# Patient Record
Sex: FEMALE | Race: OTHER | HISPANIC OR LATINO | URBAN - METROPOLITAN AREA
[De-identification: names, ages, dates, MRNs, and addresses within clinical notes are randomized per-mention and may not be internally consistent; named-entity substitution may affect disease eponyms.]

---

## 2022-08-19 ENCOUNTER — EMERGENCY (EMERGENCY)
Facility: HOSPITAL | Age: 27
LOS: 1 days | Discharge: HOME | End: 2022-08-19
Attending: STUDENT IN AN ORGANIZED HEALTH CARE EDUCATION/TRAINING PROGRAM
Payer: COMMERCIAL

## 2022-08-19 VITALS
DIASTOLIC BLOOD PRESSURE: 73 MMHG | RESPIRATION RATE: 18 BRPM | TEMPERATURE: 99 F | SYSTOLIC BLOOD PRESSURE: 109 MMHG | HEART RATE: 60 BPM | OXYGEN SATURATION: 99 %

## 2022-08-19 VITALS
SYSTOLIC BLOOD PRESSURE: 121 MMHG | TEMPERATURE: 98 F | WEIGHT: 147.93 LBS | RESPIRATION RATE: 16 BRPM | HEIGHT: 72 IN | DIASTOLIC BLOOD PRESSURE: 78 MMHG | HEART RATE: 54 BPM | OXYGEN SATURATION: 99 %

## 2022-08-19 DIAGNOSIS — F34.1 DYSTHYMIC DISORDER: ICD-10-CM

## 2022-08-19 LAB
ALBUMIN SERPL ELPH-MCNC: 4.8 G/DL — SIGNIFICANT CHANGE UP (ref 3.3–5.2)
ALP SERPL-CCNC: 80 U/L — SIGNIFICANT CHANGE UP (ref 40–120)
ALT FLD-CCNC: 15 U/L — SIGNIFICANT CHANGE UP
AMPHET UR-MCNC: NEGATIVE — SIGNIFICANT CHANGE UP
ANION GAP SERPL CALC-SCNC: 12 MMOL/L — SIGNIFICANT CHANGE UP (ref 5–17)
APAP SERPL-MCNC: <3 UG/ML — LOW (ref 10–26)
APPEARANCE UR: ABNORMAL
AST SERPL-CCNC: 21 U/L — SIGNIFICANT CHANGE UP
BACTERIA # UR AUTO: ABNORMAL
BARBITURATES UR SCN-MCNC: NEGATIVE — SIGNIFICANT CHANGE UP
BASOPHILS # BLD AUTO: 0.02 K/UL — SIGNIFICANT CHANGE UP (ref 0–0.2)
BASOPHILS NFR BLD AUTO: 0.3 % — SIGNIFICANT CHANGE UP (ref 0–2)
BENZODIAZ UR-MCNC: NEGATIVE — SIGNIFICANT CHANGE UP
BILIRUB SERPL-MCNC: 0.7 MG/DL — SIGNIFICANT CHANGE UP (ref 0.4–2)
BILIRUB UR-MCNC: NEGATIVE — SIGNIFICANT CHANGE UP
BUN SERPL-MCNC: 8.1 MG/DL — SIGNIFICANT CHANGE UP (ref 8–20)
CALCIUM SERPL-MCNC: 9.6 MG/DL — SIGNIFICANT CHANGE UP (ref 8.4–10.5)
CHLORIDE SERPL-SCNC: 105 MMOL/L — SIGNIFICANT CHANGE UP (ref 98–107)
CO2 SERPL-SCNC: 23 MMOL/L — SIGNIFICANT CHANGE UP (ref 22–29)
COCAINE METAB.OTHER UR-MCNC: NEGATIVE — SIGNIFICANT CHANGE UP
COLOR SPEC: YELLOW — SIGNIFICANT CHANGE UP
CREAT SERPL-MCNC: 0.51 MG/DL — SIGNIFICANT CHANGE UP (ref 0.5–1.3)
DIFF PNL FLD: ABNORMAL
EGFR: 132 ML/MIN/1.73M2 — SIGNIFICANT CHANGE UP
EOSINOPHIL # BLD AUTO: 0.05 K/UL — SIGNIFICANT CHANGE UP (ref 0–0.5)
EOSINOPHIL NFR BLD AUTO: 0.7 % — SIGNIFICANT CHANGE UP (ref 0–6)
EPI CELLS # UR: ABNORMAL
ETHANOL SERPL-MCNC: <10 MG/DL — SIGNIFICANT CHANGE UP (ref 0–9)
GLUCOSE SERPL-MCNC: 92 MG/DL — SIGNIFICANT CHANGE UP (ref 70–99)
GLUCOSE UR QL: NEGATIVE MG/DL — SIGNIFICANT CHANGE UP
HCG SERPL-ACNC: <4 MIU/ML — SIGNIFICANT CHANGE UP
HCT VFR BLD CALC: 42.9 % — SIGNIFICANT CHANGE UP (ref 34.5–45)
HGB BLD-MCNC: 14.4 G/DL — SIGNIFICANT CHANGE UP (ref 11.5–15.5)
IMM GRANULOCYTES NFR BLD AUTO: 0.1 % — SIGNIFICANT CHANGE UP (ref 0–1.5)
KETONES UR-MCNC: NEGATIVE — SIGNIFICANT CHANGE UP
LEUKOCYTE ESTERASE UR-ACNC: ABNORMAL
LYMPHOCYTES # BLD AUTO: 1.47 K/UL — SIGNIFICANT CHANGE UP (ref 1–3.3)
LYMPHOCYTES # BLD AUTO: 21.4 % — SIGNIFICANT CHANGE UP (ref 13–44)
MCHC RBC-ENTMCNC: 30.7 PG — SIGNIFICANT CHANGE UP (ref 27–34)
MCHC RBC-ENTMCNC: 33.6 GM/DL — SIGNIFICANT CHANGE UP (ref 32–36)
MCV RBC AUTO: 91.5 FL — SIGNIFICANT CHANGE UP (ref 80–100)
METHADONE UR-MCNC: NEGATIVE — SIGNIFICANT CHANGE UP
MONOCYTES # BLD AUTO: 0.4 K/UL — SIGNIFICANT CHANGE UP (ref 0–0.9)
MONOCYTES NFR BLD AUTO: 5.8 % — SIGNIFICANT CHANGE UP (ref 2–14)
NEUTROPHILS # BLD AUTO: 4.92 K/UL — SIGNIFICANT CHANGE UP (ref 1.8–7.4)
NEUTROPHILS NFR BLD AUTO: 71.7 % — SIGNIFICANT CHANGE UP (ref 43–77)
NITRITE UR-MCNC: NEGATIVE — SIGNIFICANT CHANGE UP
OPIATES UR-MCNC: NEGATIVE — SIGNIFICANT CHANGE UP
PCP SPEC-MCNC: SIGNIFICANT CHANGE UP
PCP UR-MCNC: NEGATIVE — SIGNIFICANT CHANGE UP
PH UR: 6.5 — SIGNIFICANT CHANGE UP (ref 5–8)
PLATELET # BLD AUTO: 185 K/UL — SIGNIFICANT CHANGE UP (ref 150–400)
POTASSIUM SERPL-MCNC: 4.1 MMOL/L — SIGNIFICANT CHANGE UP (ref 3.5–5.3)
POTASSIUM SERPL-SCNC: 4.1 MMOL/L — SIGNIFICANT CHANGE UP (ref 3.5–5.3)
PROT SERPL-MCNC: 8.3 G/DL — SIGNIFICANT CHANGE UP (ref 6.6–8.7)
PROT UR-MCNC: NEGATIVE — SIGNIFICANT CHANGE UP
RBC # BLD: 4.69 M/UL — SIGNIFICANT CHANGE UP (ref 3.8–5.2)
RBC # FLD: 11.9 % — SIGNIFICANT CHANGE UP (ref 10.3–14.5)
RBC CASTS # UR COMP ASSIST: ABNORMAL /HPF (ref 0–4)
SALICYLATES SERPL-MCNC: <0.6 MG/DL — LOW (ref 10–20)
SARS-COV-2 RNA SPEC QL NAA+PROBE: SIGNIFICANT CHANGE UP
SODIUM SERPL-SCNC: 140 MMOL/L — SIGNIFICANT CHANGE UP (ref 135–145)
SP GR SPEC: 1.01 — SIGNIFICANT CHANGE UP (ref 1.01–1.02)
THC UR QL: NEGATIVE — SIGNIFICANT CHANGE UP
TSH SERPL-MCNC: 1.08 UIU/ML — SIGNIFICANT CHANGE UP (ref 0.27–4.2)
UROBILINOGEN FLD QL: NEGATIVE MG/DL — SIGNIFICANT CHANGE UP
WBC # BLD: 6.87 K/UL — SIGNIFICANT CHANGE UP (ref 3.8–10.5)
WBC # FLD AUTO: 6.87 K/UL — SIGNIFICANT CHANGE UP (ref 3.8–10.5)
WBC UR QL: ABNORMAL /HPF (ref 0–5)

## 2022-08-19 PROCEDURE — 84443 ASSAY THYROID STIM HORMONE: CPT

## 2022-08-19 PROCEDURE — 99285 EMERGENCY DEPT VISIT HI MDM: CPT

## 2022-08-19 PROCEDURE — 90792 PSYCH DIAG EVAL W/MED SRVCS: CPT

## 2022-08-19 PROCEDURE — U0003: CPT

## 2022-08-19 PROCEDURE — U0005: CPT

## 2022-08-19 PROCEDURE — 85025 COMPLETE CBC W/AUTO DIFF WBC: CPT

## 2022-08-19 PROCEDURE — 80307 DRUG TEST PRSMV CHEM ANLYZR: CPT

## 2022-08-19 PROCEDURE — 93005 ELECTROCARDIOGRAM TRACING: CPT

## 2022-08-19 PROCEDURE — 80053 COMPREHEN METABOLIC PANEL: CPT

## 2022-08-19 PROCEDURE — 99283 EMERGENCY DEPT VISIT LOW MDM: CPT

## 2022-08-19 PROCEDURE — 93010 ELECTROCARDIOGRAM REPORT: CPT

## 2022-08-19 PROCEDURE — 36415 COLL VENOUS BLD VENIPUNCTURE: CPT

## 2022-08-19 PROCEDURE — 84702 CHORIONIC GONADOTROPIN TEST: CPT

## 2022-08-19 PROCEDURE — 81001 URINALYSIS AUTO W/SCOPE: CPT

## 2022-08-19 NOTE — ED BEHAVIORAL HEALTH ASSESSMENT NOTE - DETAILS
na with grandmother  when Pt works h/o OD 8 pills age 16, unreported Pt will return to ED if SI or if feels unsafe

## 2022-08-19 NOTE — CHART NOTE - NSCHARTNOTEFT_GEN_A_CORE
Fidencio: pt seen by behavioral NP(Harrison) pt to benefit from therapy. [facility] services explained ,in agreement to appointment . Schedule reviewed , appt given for Friday August 26 at 14:30. Release of info signed per protocol, brochure given . Best number to call pt . Aware to call 911 or to go to nearest hospital if symptoms worsen.  No other concerns reported at this moment. Email to be sent asap.

## 2022-08-19 NOTE — ED PROVIDER NOTE - NSFOLLOWUPINSTRUCTIONS_ED_ALL_ED_FT
Depression    Depression is a mental illness that usually causes feelings of sadness, hopelessness, or helplessness. Some people with this disorder do not feel particularly sad but lose interest in doing things they used to enjoy. Major depressive disorder also can cause physical symptoms. It can interfere with work, school, relationships, and other normal everyday activities. If you were started on a medication, make sure to take exactly as prescribed and follow up with a psychiatrist.    SEEK IMMEDIATE MEDICAL CARE IF YOU HAVE ANY OF THE FOLLOWING SYMPTOMS: thoughts about hurting or killing yourself, thoughts about hurting or killing somebody else, hallucinations, or worsening depression.     Depresión    La depresión es ashlee enfermedad mental que generalmente provoca sentimientos de tristeza, desesperanza o impotencia. Algunas personas con george trastorno no se sienten particularmente tristes, beata pierden interés en hacer las cosas que solían disfrutar. El trastorno depresivo mayor también puede causar síntomas físicos. Puede interferir con el trabajo, la escuela, las relaciones y otras actividades cotidianas normales. Si comenzó a prasanth un medicamento, asegúrese de tomarlo exactamente según lo recetado y kelsy un seguimiento con un psiquiatra.    BUSQUE ATENCIÓN MÉDICA DE INMEDIATO SI TIENE ALGUNO DE LOS SIGUIENTES SÍNTOMAS: pensamientos sobre lastimarse o suicidarse, pensamientos sobre lastimar o matar a otra persona, alucinaciones o empeoramiento de la depresión.

## 2022-08-19 NOTE — ED PROVIDER NOTE - NS ED ROS FT
Constitutional: No fever.  HEENT: Visual disturbances during HA per HPI. No neck pain.   Pulm: No shortness of breath.  Cardio: No chest pain.  GI:  No abdominal pain, No nausea, No vomiting, No diarrhea.  : No dysuria.  Neuro: Headaches per HPI.   MSK: No joint pain.

## 2022-08-19 NOTE — ED ADULT NURSE NOTE - OBJECTIVE STATEMENT
Pt sent from clinic for depression and psychiatric evaluation. Pt admits to SI but says she has no active plan and denies HI, AH, and VH. Pt says her depression has been elevated this year due to family issues and other stressors in life.

## 2022-08-19 NOTE — ED PROVIDER NOTE - ATTENDING CONTRIBUTION TO CARE
ALF Ac: 27F 1 mo suicidality, here for same, will check labs,  consult, follow up studies, reassess, dispo.     I have personally performed a face to face diagnostic evaluation on this patient.  I have reviewed the resident's note and agree with the history, exam, and plan of care, except as noted.   My medical decision making and observations are found above.

## 2022-08-19 NOTE — ED BEHAVIORAL HEALTH ASSESSMENT NOTE - SUMMARY
26 yo  female, from [facility] and moved to this country age 16 to live with mother, now lives alone with 7 yo daughter who is under care of her paternal grandmother, no formal PPH, tx, psych admissions, report one suicide attempt by OD pills age 17, unreported because she missed her family in [facility] and did not want to live with her mother at the time, no PMH reports was at GYN and told her of intermittent passive SI sometimes and financial stressors  and was sent to ED for eval.  Pt speaks english reports worry about her finances and living where she is because is dirty.  Pt recently found a [employer] apt and is happy to be moving.  Pt reports she sometimes worries about her finances and has to work all the time to support she and daughter and feels she does not see her daughter as much as she wants.  Pt reports she is happy with her daughters grandmother as her caregiver and daughter likes her as well and child has been spending more time with her father when Pt works.  Pt denies active SIIP and states she would never hurt herself due to responsibility to daughter.  She has medical insurance and is interested in tx for depressed  mood which is intermittent.  Other c/o is too much/little sleep, and decreased hunger.  Denies AH/VH psychosis deric or acute conditions which requires psych admission.  Pt agreeable to referral to [facility].  Pt denies anyone available for collaterals.

## 2022-08-19 NOTE — ED BEHAVIORAL HEALTH ASSESSMENT NOTE - HPI (INCLUDE ILLNESS QUALITY, SEVERITY, DURATION, TIMING, CONTEXT, MODIFYING FACTORS, ASSOCIATED SIGNS AND SYMPTOMS)
28 yo  female, from [facility] and moved to this country age 16 to live with mother, now lives alone with 5 yo daughter who is under care of her paternal grandmother, no formal PPH, tx, psych admissions, report one suicide attempt by OD pills age 17, unreported because she missed her family in [facility] and did not want to live with her mother at the time, no PMH reports was at GYN and told her of intermittent passive SI sometimes and financial stressors  and was sent to ED for eval.  Pt speaks english reports worry about her finances and living where she is because is dirty.  Pt recently found a [employer] apt and is happy to be moving.  Pt reports she sometimes worries about her finances and has to work all the time to support she and daughter and feels she does not see her daughter as much as she wants.  Pt reports she is happy with her daughters grandmother as her caregiver and daughter likes her as well and child has been spending more time with her father when Pt works.  Pt denies active SIIP and states she would never hurt herself due to responsibility to daughter.  She has medical insurance and is interested in tx for depressed  mood which is intermittent.  Other c/o is too much/little sleep, and decreased hunger.  Denies AH/VH psychosis deric or acute conditions which requires psych admission.  Pt agreeable to referral to [facility].  Pt denies anyone available for collaterals.

## 2022-08-19 NOTE — ED ADULT TRIAGE NOTE - SPO2 (%)
SUBJECTIVE:     Nhi Mims is a 10 month old female, here for a routine health maintenance visit.    Patient was roomed by: DEANDRE HINES MA    Well Child     Social History  Forms to complete? No  Child lives with::  Mother, father and brother  Who takes care of your child?:  , father and mother  Languages spoken in the home:  English and Chinese  Recent family changes/ special stressors?:  None noted    Safety / Health Risk  Is your child around anyone who smokes?  No    TB Exposure:     No TB exposure    Car seat < 6 years old, in  back seat, rear-facing, 5-point restraint? Yes    Home Safety Survey:      Stairs Gated?:  Yes     Wood stove / Fireplace screened?  Not applicable     Poisons / cleaning supplies out of reach?:  Yes     Swimming pool?:  No     Firearms in the home?: No      Hearing / Vision  Hearing or vision concerns?  No concerns, hearing and vision subjectively normal    Daily Activities    Water source:  City water and bottled water  Nutrition:  Formula, pureed foods and finger feeding  Formula:  Simiilac  Vitamins & Supplements:  No    Elimination       Urinary frequency:more than 6 times per 24 hours     Stool frequency: 1-3 times per 24 hours     Stool consistency: soft     Elimination problems:  None    Sleep      Sleep arrangement:crib    Sleep position:  On back and on stomach    Sleep pattern: wakes at night for feedings, sleeps through the night, regular bedtime routine, waking at night and naps (add details)      Dental visit recommended: No  Dental Varnish Application    Contraindications: None    Dental Fluoride applied to teeth by: MA/LPN/RN    Next treatment due in:  Next preventive care visit    DEVELOPMENT  Screening tool used, reviewed with parent/guardian: Electronic M-CHAT-R No flowsheet data found. Follow-up:    ASQ 9 M Communication Gross Motor Fine Motor Problem Solving Personal-social   Score 45 45 50 45 35   Cutoff 13.97 17.82 31.32 28.72 18.91   Result  "Passed Passed Passed Passed Passed         PROBLEM LIST  Patient Active Problem List   Diagnosis     Meconium aspiration syndrome     MEDICATIONS  No current outpatient medications on file.      ALLERGY  No Known Allergies    IMMUNIZATIONS  Immunization History   Administered Date(s) Administered     DTAP-IPV/HIB (PENTACEL) 2019     DTaP / Hep B / IPV 2019, 2019     Hep B, Peds or Adolescent 2019, 2019     Pedvax-hib 2019, 2019     Pneumo Conj 13-V (2010&after) 2019, 2019, 2019     Rotavirus, monovalent, 2-dose 2019, 2019       HEALTH HISTORY SINCE LAST VISIT  No surgery, major illness or injury since last physical exam    ROS  All other systems on a 10-point review are negative, unless otherwise noted in HPI      OBJECTIVE:   EXAM  Pulse 112   Temp 98  F (36.7  C) (Axillary)   Resp 26   Ht 2' 3.17\" (0.69 m)   Wt 17 lb 7.5 oz (7.924 kg)   HC 17.72\" (45 cm)   SpO2 99%   BMI 16.64 kg/m    71 %ile based on WHO (Girls, 0-2 years) head circumference-for-age based on Head Circumference recorded on 2019.  29 %ile based on WHO (Girls, 0-2 years) weight-for-age data based on Weight recorded on 2019.  15 %ile based on WHO (Girls, 0-2 years) Length-for-age data based on Length recorded on 2019.  48 %ile based on WHO (Girls, 0-2 years) weight-for-recumbent length based on body measurements available as of 2019.  GENERAL: Active, alert,  no  distress.  SKIN: Clear. No significant rash, abnormal pigmentation or lesions.  HEAD: Normocephalic. Normal fontanels and sutures.  EYES: Conjunctivae and cornea normal. Red reflexes present bilaterally. Symmetric light reflex and no eye movement on cover/uncover test  EARS: normal: no effusions, no erythema, normal landmarks  NOSE: Normal without discharge.  MOUTH/THROAT: Clear. No oral lesions.  NECK: Supple, no masses.  LYMPH NODES: No adenopathy  LUNGS: Clear. No rales, rhonchi, " wheezing or retractions  HEART: Regular rate and rhythm. Normal S1/S2. No murmurs. Normal femoral pulses.  ABDOMEN: Soft, non-tender, not distended, no masses or hepatosplenomegaly. Normal umbilicus and bowel sounds.   GENITALIA: Normal female external genitalia. Norbert stage I,  No inguinal herniae are present.  EXTREMITIES: Hips normal with symmetric creases and full range of motion. Symmetric extremities, no deformities  NEUROLOGIC: Normal tone throughout. Normal reflexes for age    ASSESSMENT/PLAN:       ICD-10-CM    1. Encounter for routine child health examination w/o abnormal findings Z00.129 DEVELOPMENTAL TEST, CERVANTES     APPLICATION TOPICAL FLUORIDE VARNISH (28056)     IMMUNIZATION ADMIN, FIRST       Anticipatory Guidance  Reviewed Anticipatory Guidance in patient instructions    Preventive Care Plan  Immunizations     See orders in [facility].  I reviewed the signs and symptoms of adverse effects and when to seek medical care if they should arise.  Referrals/Ongoing Specialty care: No   See other orders in [facility]    Resources:  [place] Child and Teen Checkups (C&TC) Schedule of Age-Related Screening Standards    FOLLOW-UP:    12 month Preventive Care visit    Yuri Ivan MD  [facility]   99

## 2022-08-19 NOTE — ED PROVIDER NOTE - PATIENT PORTAL LINK FT
You can access the FollowMyHealth Patient Portal offered by [facility] by registering at the following website: [email or web address]. By joining [employer]’s FollowMyHealth portal, you will also be able to view your health information using other applications (apps) compatible with our system.

## 2022-08-19 NOTE — ED PROVIDER NOTE - OBJECTIVE STATEMENT
The patient is a 27y Female w/ no PMH p/w 1 month of intermittent active suicidality and sent into ED for psych eval after appointment today. Pt states that she has had a depressed mood for the past month while endorsing thoughts of driving her car into an object that did not have people in, or around. Pt states that she is not currently expressing those thoughts or any other thoughts of suicide; they also deny HI. Pt states that she has had migraines, usually on the left side of head associated with vision changes; last episode was last week.

## 2022-08-19 NOTE — ED BEHAVIORAL HEALTH ASSESSMENT NOTE - DESCRIPTION
T(C): 37.1 (08-19-22 @ 15:36), Max: 37.1 (08-19-22 @ 15:36)  T(F): 98.7 (08-19-22 @ 15:36), Max: 98.7 (08-19-22 @ 15:36)  HR: 60 (08-19-22 @ 15:36) (54 - 60)  BP: 109/73 (08-19-22 @ 15:36) (109/73 - 121/78)  RR: 18 (08-19-22 @ 15:36) (16 - 18)  SpO2: 99% (08-19-22 @ 15:36) (99% - 99%) single mom, employed emigrated to [place] age 16 none

## 2022-08-19 NOTE — ED PROVIDER NOTE - PHYSICAL EXAMINATION
General: Patient is in no distress and laying comfortably on hospital bed.  HEENT: PERRLA, EOMI  Cardiac: Regular rate, with no murmurs or rubs appreciated.  Pulm: Clear to auscultation bilaterally, with no crackles, rhonchi, or wheezes appreciated.  Abd: Soft nontender, nondistended, abdomen. No guarding or rebound tenderness.  Skin: Skin is warm and dry.  Neuro: AAOX3- Person, place, time.

## 2022-08-19 NOTE — ED ADULT NURSE NOTE - NS ED NURSE LEVEL OF CONSCIOUSNESS ORIENTATION
ULTRASOUND OF THE PELVIS 



CLINICAL HISTORY: Pelvic pain.



COMPARISON STUDY: Pelvic CT dated 3/19/2013.



TECHNIQUE: Real-time, grayscale, and color flow sonography of the pelvis is

performed both transabdominally and endovaginally. Images are reviewed in the

transverse and longitudinal planes.



FINDINGS:



Uterus: The retroverted uterus is normal in size and echotexture, measuring 9.2

x 4.7 x 5.8 cm. Small nabothian cysts are incidentally noted in the cervix.



Endometrium: The endometrium is normal in appearance, and the endometrial stripe

is normal in thickness measuring up to 0.7 cm.



Ovaries: The ovaries are normal in size and morphology. The right ovary measures

3.1 x 1.7 x 2.2 cm and the left ovary measures 2.9 x 1.5 x 2.1 cm. Small

follicles are present bilaterally. Normal Doppler waveforms are shown within

both ovaries.



Pelvis: There is no free fluid in the cul-de-sac. No concerning adnexal lesion

is seen.





IMPRESSION: No acute sonographic abnormality is identified in the pelvis.







Electronically signed by:  Oziel Sharma M.D.

1/9/2017 4:46 PM



Dictated Date/Time:  1/9/2017 4:45 PM Oriented - self; Oriented - place; Oriented - time

## 2022-08-19 NOTE — ED BEHAVIORAL HEALTH ASSESSMENT NOTE - PATIENT'S CHIEF COMPLAINT
"I told my doctor I am sometimes depressed and do not want to be here but I would never hurt myself because of my daughter".

## 2022-08-19 NOTE — ED PROVIDER NOTE - PROGRESS NOTE DETAILS
ALF Ac: patient cleared by , reassessed, denies SI/HI,AVH, wants to go  her daughter from  after leaving, feels like she has good resources to follow up outpatient, understands she can reutrn for any worsening. will write for d/c as per plan in dispo section of note.

## 2022-08-19 NOTE — ED ADULT TRIAGE NOTE - CHIEF COMPLAINT QUOTE
Patient c/o depression x 2 years. Was at the doctor's office today and endorses depression. Denies SI/HI, states that "my daughter is the only thing keeping me going."

## 2022-08-19 NOTE — ED PROVIDER NOTE - CLINICAL SUMMARY MEDICAL DECISION MAKING FREE TEXT BOX
The patient is a 27y Female w/ no PMH p/w 1 month of intermittent active suicidality and sent into ED for psych eval after appointment today. Vitals not actionable; PE unremarkable. DDx includes suicidality due to psychiatric illness vs. medically induced mood disorder w/ suicidality. Labs ordered. Will consult psychiatry.     -Daniel Bear, MS4

## 2022-08-19 NOTE — ED PROVIDER NOTE - NSICDXPASTMEDICALHX_GEN_ALL_CORE_FT
PAST MEDICAL HISTORY:  MDD (major depressive disorder)     TB (tuberculosis) positive skin test , took meds

## 2022-08-19 NOTE — ED PROVIDER NOTE - NSFOLLOWUPCLINICS_GEN_ALL_ED_FT
[facility]  Psychiatry  [address] [place] [address]  Phone: [phone]  Fax:   Follow Up Time: 4-6 Days

## 2023-01-06 ENCOUNTER — EMERGENCY (EMERGENCY)
Facility: HOSPITAL | Age: 28
LOS: 1 days | Discharge: HOME | End: 2023-01-06
Attending: EMERGENCY MEDICINE
Payer: COMMERCIAL

## 2023-01-06 VITALS
SYSTOLIC BLOOD PRESSURE: 106 MMHG | DIASTOLIC BLOOD PRESSURE: 71 MMHG | TEMPERATURE: 98 F | RESPIRATION RATE: 18 BRPM | HEART RATE: 71 BPM | HEIGHT: 63 IN | OXYGEN SATURATION: 100 % | WEIGHT: 141.1 LBS

## 2023-01-06 PROBLEM — F32.9 MAJOR DEPRESSIVE DISORDER, SINGLE EPISODE, UNSPECIFIED: Chronic | Status: ACTIVE | Noted: 2022-08-19

## 2023-01-06 LAB
ALBUMIN SERPL ELPH-MCNC: 4.3 G/DL — SIGNIFICANT CHANGE UP (ref 3.3–5.2)
ALP SERPL-CCNC: 86 U/L — SIGNIFICANT CHANGE UP (ref 40–120)
ALT FLD-CCNC: 16 U/L — SIGNIFICANT CHANGE UP
ANION GAP SERPL CALC-SCNC: 9 MMOL/L — SIGNIFICANT CHANGE UP (ref 5–17)
AST SERPL-CCNC: 20 U/L — SIGNIFICANT CHANGE UP
BASOPHILS # BLD AUTO: 0.02 K/UL — SIGNIFICANT CHANGE UP (ref 0–0.2)
BASOPHILS NFR BLD AUTO: 0.3 % — SIGNIFICANT CHANGE UP (ref 0–2)
BILIRUB SERPL-MCNC: 0.3 MG/DL — LOW (ref 0.4–2)
BUN SERPL-MCNC: 4.3 MG/DL — LOW (ref 8–20)
CALCIUM SERPL-MCNC: 9.2 MG/DL — SIGNIFICANT CHANGE UP (ref 8.4–10.5)
CHLORIDE SERPL-SCNC: 105 MMOL/L — SIGNIFICANT CHANGE UP (ref 96–108)
CO2 SERPL-SCNC: 26 MMOL/L — SIGNIFICANT CHANGE UP (ref 22–29)
CREAT SERPL-MCNC: 0.47 MG/DL — LOW (ref 0.5–1.3)
D DIMER BLD IA.RAPID-MCNC: <150 NG/ML DDU — SIGNIFICANT CHANGE UP
EGFR: 135 ML/MIN/1.73M2 — SIGNIFICANT CHANGE UP
EOSINOPHIL # BLD AUTO: 0.07 K/UL — SIGNIFICANT CHANGE UP (ref 0–0.5)
EOSINOPHIL NFR BLD AUTO: 1 % — SIGNIFICANT CHANGE UP (ref 0–6)
GLUCOSE SERPL-MCNC: 82 MG/DL — SIGNIFICANT CHANGE UP (ref 70–99)
HCG SERPL-ACNC: <4 MIU/ML — SIGNIFICANT CHANGE UP
HCT VFR BLD CALC: 40.9 % — SIGNIFICANT CHANGE UP (ref 34.5–45)
HGB BLD-MCNC: 13.6 G/DL — SIGNIFICANT CHANGE UP (ref 11.5–15.5)
IMM GRANULOCYTES NFR BLD AUTO: 0.6 % — SIGNIFICANT CHANGE UP (ref 0–0.9)
LYMPHOCYTES # BLD AUTO: 2.06 K/UL — SIGNIFICANT CHANGE UP (ref 1–3.3)
LYMPHOCYTES # BLD AUTO: 28.5 % — SIGNIFICANT CHANGE UP (ref 13–44)
MCHC RBC-ENTMCNC: 30.2 PG — SIGNIFICANT CHANGE UP (ref 27–34)
MCHC RBC-ENTMCNC: 33.3 GM/DL — SIGNIFICANT CHANGE UP (ref 32–36)
MCV RBC AUTO: 90.7 FL — SIGNIFICANT CHANGE UP (ref 80–100)
MONOCYTES # BLD AUTO: 0.38 K/UL — SIGNIFICANT CHANGE UP (ref 0–0.9)
MONOCYTES NFR BLD AUTO: 5.2 % — SIGNIFICANT CHANGE UP (ref 2–14)
NEUTROPHILS # BLD AUTO: 4.67 K/UL — SIGNIFICANT CHANGE UP (ref 1.8–7.4)
NEUTROPHILS NFR BLD AUTO: 64.4 % — SIGNIFICANT CHANGE UP (ref 43–77)
PLATELET # BLD AUTO: 202 K/UL — SIGNIFICANT CHANGE UP (ref 150–400)
POTASSIUM SERPL-MCNC: 4.3 MMOL/L — SIGNIFICANT CHANGE UP (ref 3.5–5.3)
POTASSIUM SERPL-SCNC: 4.3 MMOL/L — SIGNIFICANT CHANGE UP (ref 3.5–5.3)
PROT SERPL-MCNC: 7.7 G/DL — SIGNIFICANT CHANGE UP (ref 6.6–8.7)
RBC # BLD: 4.51 M/UL — SIGNIFICANT CHANGE UP (ref 3.8–5.2)
RBC # FLD: 12 % — SIGNIFICANT CHANGE UP (ref 10.3–14.5)
SODIUM SERPL-SCNC: 140 MMOL/L — SIGNIFICANT CHANGE UP (ref 135–145)
TROPONIN T SERPL-MCNC: <0.01 NG/ML — SIGNIFICANT CHANGE UP (ref 0–0.06)
WBC # BLD: 7.24 K/UL — SIGNIFICANT CHANGE UP (ref 3.8–10.5)
WBC # FLD AUTO: 7.24 K/UL — SIGNIFICANT CHANGE UP (ref 3.8–10.5)

## 2023-01-06 PROCEDURE — 93010 ELECTROCARDIOGRAM REPORT: CPT

## 2023-01-06 PROCEDURE — 99285 EMERGENCY DEPT VISIT HI MDM: CPT

## 2023-01-06 PROCEDURE — 96374 THER/PROPH/DIAG INJ IV PUSH: CPT

## 2023-01-06 PROCEDURE — 93005 ELECTROCARDIOGRAM TRACING: CPT

## 2023-01-06 PROCEDURE — 71046 X-RAY EXAM CHEST 2 VIEWS: CPT | Mod: 26

## 2023-01-06 PROCEDURE — 80053 COMPREHEN METABOLIC PANEL: CPT

## 2023-01-06 PROCEDURE — 85025 COMPLETE CBC W/AUTO DIFF WBC: CPT

## 2023-01-06 PROCEDURE — 84484 ASSAY OF TROPONIN QUANT: CPT

## 2023-01-06 PROCEDURE — 85379 FIBRIN DEGRADATION QUANT: CPT

## 2023-01-06 PROCEDURE — 84702 CHORIONIC GONADOTROPIN TEST: CPT

## 2023-01-06 PROCEDURE — 36415 COLL VENOUS BLD VENIPUNCTURE: CPT

## 2023-01-06 PROCEDURE — 71046 X-RAY EXAM CHEST 2 VIEWS: CPT

## 2023-01-06 RX ORDER — IBUPROFEN 200 MG
400 TABLET ORAL ONCE
Refills: 0 | Status: COMPLETED | OUTPATIENT
Start: 2023-01-06 | End: 2023-01-06

## 2023-01-06 RX ORDER — SODIUM CHLORIDE 9 MG/ML
3 INJECTION INTRAMUSCULAR; INTRAVENOUS; SUBCUTANEOUS ONCE
Refills: 0 | Status: COMPLETED | OUTPATIENT
Start: 2023-01-06 | End: 2023-01-06

## 2023-01-06 RX ADMIN — Medication 400 MILLIGRAM(S): at 15:45

## 2023-01-06 RX ADMIN — SODIUM CHLORIDE 3 MILLILITER(S): 9 INJECTION INTRAMUSCULAR; INTRAVENOUS; SUBCUTANEOUS at 15:50

## 2023-01-06 NOTE — ED PROVIDER NOTE - NSFOLLOWUPCLINICS_GEN_ALL_ED_FT
[facility]  Cardiology  [address] [place] [address]  Phone: [phone]  Fax:   Follow Up Time: 1-3 Days    [facility]  Cardiology  [address] [facility] [facility] [address]  [place] [address]  Phone: [phone]  Fax:   Follow Up Time: 1-3 Days

## 2023-01-06 NOTE — ED ADULT NURSE NOTE - OBJECTIVE STATEMENT
Pt reports cough and SOB, inspiratory CP that has been present since Saturday. Pt appears in NAD and is speaking coherently and in full sentences. Pt denies back pains and there is no cough noted. Pt with IUD implanted in LUE for the last year and a few months.

## 2023-01-06 NOTE — ED PROVIDER NOTE - MUSCULOSKELETAL, MLM
Spine appears normal, range of motion is not limited, no muscle or joint tenderness, no calf tenderness, no lower extremity edema

## 2023-01-06 NOTE — ED PROVIDER NOTE - NS ED ATTENDING STATEMENT MOD
This was a shared visit with the BUSTER. I reviewed and verified the documentation and independently performed the documented:

## 2023-01-06 NOTE — ED PROVIDER NOTE - CLINICAL SUMMARY MEDICAL DECISION MAKING FREE TEXT BOX
pt is 28 y/o female with no PMHx present to ED with left sided chest pain described as pressure and tightness that radiates to her left shoulder associated with SOB. Pt also admits to SOB with exertion. Pt physical exam does not reveal any focal findings, as such EKG will be obtain to evaluate for cardiac ischemia, CXR for cardiopulmomary abnormalities., labs including troponin for ischemia and d-dimer to test for potential PE will be obtain as well. Pt demonstrates understanding of finding on exam and plan of care.

## 2023-01-06 NOTE — ED PROVIDER NOTE - NSFOLLOWUPINSTRUCTIONS_ED_ALL_ED_FT
Please take motrin 600mg as needed for pain with food.   1)Follow up with your PCP within 2-3 days  2)Follow up with cardiology clinic within 2-3 days  3)Please return to the emergency room if you are experiencing any new or worsening symptoms.    Chest Pain    Chest pain can be caused by many different conditions which may or may not be dangerous. Causes include heartburn, lung infections, heart attack, blood clot in lungs, skin infections, strain or damage to muscle, cartilage, or bones, etc. In addition to a history and physical examination, an electrocardiogram (ECG) or other lab tests may have been performed to determine the cause of your chest pain. Follow up with your primary care provider or with a cardiologist as instructed.     SEEK IMMEDIATE MEDICAL CARE IF YOU HAVE ANY OF THE FOLLOWING SYMPTOMS: worsening chest pain, coughing up blood, unexplained back/neck/jaw pain, severe abdominal pain, dizziness or lightheadedness, fainting, shortness of breath, sweaty or clammy skin, vomiting, or racing heart beat. These symptoms may represent a serious problem that is an emergency. Do not wait to see if the symptoms will go away. Get medical help right away. Call 911 and do not drive yourself to the hospital.

## 2023-01-06 NOTE — ED PROVIDER NOTE - PATIENT PORTAL LINK FT
You can access the FollowMyHealth Patient Portal offered by [facility] by registering at the following website: [email or web address]. By joining [employer]’s FollowMyHealth portal, you will also be able to view your health information using other applications (apps) compatible with our system.

## 2023-01-06 NOTE — ED PROVIDER NOTE - ATTENDING APP SHARED VISIT CONTRIBUTION OF CARE
I, Mora Quintanilla, performed the initial face to face bedside interview with this patient regarding history of present illness, review of symptoms and relevant past medical, social and family history.  I completed an independent physical examination.  I was the initial provider who evaluated this patient. I have signed out the follow up of any pending tests (i.e. labs, radiological studies) to the ACP.  I have communicated the patient’s plan of care and disposition with the ACP.  The history, relevant review of systems, past medical and surgical history, medical decision making, and physical examination was documented by the scribe in my presence and I attest to the accuracy of the documentation.

## 2023-01-06 NOTE — ED PROVIDER NOTE - OBJECTIVE STATEMENT
28 y/o female presents to the ED c/o constant chest pressure/tightness that radiates to her left shoulder associated with SOB for the past 2 days. Pt states she has to take a deep breath in order to feel like she is getting a normal breath. Pt also notes SOB with exertion as well.. Pt has birth control implant which she has had for the past 16 months. Pt denies smoking, hx of HTN, HLD, DM. Pt denies sudden cardiac deaths in immediate family. Pt denies fevers, abdominal pain, N/V, calf pain or calf swelling. Pt did not take anything for her symptoms PTA.      : Sofy

## 2023-01-06 NOTE — ED PROVIDER NOTE - PROGRESS NOTE DETAILS
Pt reports improvement of symptoms in the ED.  EKG NSR at 61 bpm without any acute ischemic changes  Labs unremarkable. Troponin and D-dimer negative  CXR reviewed and unremarkable.  Instructed to take motrin prn pain and f/u with cardio/PCP within 2-3 days  Strict ED return precautions given if any new or worsening symptoms
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
## 2024-04-30 ENCOUNTER — EMERGENCY (EMERGENCY)
Facility: HOSPITAL | Age: 29
LOS: 1 days | Discharge: HOME | End: 2024-04-30
Attending: EMERGENCY MEDICINE
Payer: COMMERCIAL

## 2024-04-30 VITALS
OXYGEN SATURATION: 100 % | HEART RATE: 61 BPM | RESPIRATION RATE: 18 BRPM | SYSTOLIC BLOOD PRESSURE: 114 MMHG | TEMPERATURE: 98 F | WEIGHT: 139.99 LBS | DIASTOLIC BLOOD PRESSURE: 79 MMHG | HEIGHT: 61 IN

## 2024-04-30 PROCEDURE — 96372 THER/PROPH/DIAG INJ SC/IM: CPT

## 2024-04-30 PROCEDURE — 99283 EMERGENCY DEPT VISIT LOW MDM: CPT

## 2024-04-30 PROCEDURE — 99284 EMERGENCY DEPT VISIT MOD MDM: CPT | Mod: 25

## 2024-04-30 RX ORDER — KETOROLAC TROMETHAMINE 30 MG/ML
30 SYRINGE (ML) INJECTION ONCE
Refills: 0 | Status: DISCONTINUED | OUTPATIENT
Start: 2024-04-30 | End: 2024-04-30

## 2024-04-30 RX ORDER — DIPHENHYDRAMINE HCL 50 MG
25 CAPSULE ORAL ONCE
Refills: 0 | Status: COMPLETED | OUTPATIENT
Start: 2024-04-30 | End: 2024-04-30

## 2024-04-30 RX ORDER — METOCLOPRAMIDE HCL 10 MG
10 TABLET ORAL ONCE
Refills: 0 | Status: COMPLETED | OUTPATIENT
Start: 2024-04-30 | End: 2024-04-30

## 2024-04-30 RX ADMIN — Medication 25 MILLIGRAM(S): at 02:15

## 2024-04-30 RX ADMIN — Medication 10 MILLIGRAM(S): at 02:16

## 2024-04-30 RX ADMIN — Medication 30 MILLIGRAM(S): at 02:15

## 2024-04-30 NOTE — ED ADULT TRIAGE NOTE - CHIEF COMPLAINT QUOTE
Ambulatory to ED c/o headache for 1 day, with nausea.  denies vomiting, tingling, dizziness, weakness, fall, vision changes.  last took excedrine 1300 yesterday

## 2024-04-30 NOTE — ED PROVIDER NOTE - CARE PROVIDERS DIRECT ADDRESSES
[email or web address]

## 2024-04-30 NOTE — ED PROVIDER NOTE - PATIENT PORTAL LINK FT
You can access the FollowMyHealth Patient Portal offered by [facility] by registering at the following website: [email or web address]. By joining [facility]’s FollowMyHealth portal, you will also be able to view your health information using other applications (apps) compatible with our system.

## 2024-04-30 NOTE — ED PROVIDER NOTE - CARE PROVIDER_API CALL
Bharath Mathur  Neurology  [address] [place] [address], [facility] [address]  [place] [address]  Phone: [phone]  Fax: [phone]  Follow Up Time:

## 2024-04-30 NOTE — ED ADULT NURSE NOTE - NSFALLUNIVINTERV_ED_ALL_ED
Bed/Stretcher in lowest position, wheels locked, appropriate side rails in place/Call bell, personal items and telephone in reach/Instruct patient to call for assistance before getting out of bed/chair/stretcher/Non-slip footwear applied when patient is off stretcher/[place] to call system/Physically safe environment - no spills, clutter or unnecessary equipment/Purposeful proactive rounding/Room/bathroom lighting operational, light cord in reach

## 2024-04-30 NOTE — ED ADULT NURSE NOTE - OBJECTIVE STATEMENT
Patient presents to ED c/o frontal bilateral headache with tingling on right side of face.  Patient states she has a history of migraines and follows with neurology.  Denies fever/chills, c/p, sob.  No further complaints at this time.

## 2024-04-30 NOTE — ED ADULT NURSE NOTE - NSICDXNOPASTSURGICALHX_GEN_ALL_CORE
<-- Click to add NO significant Past Surgical History Adjustment disorder with mixed anxiety and depressed mood

## 2024-04-30 NOTE — ED PROVIDER NOTE - OBJECTIVE STATEMENT
29 year old female with no med hx presented to ED c/o headache. bilateral frontal, tense, w/ right facial tingling. denies difficulty talking, numbness, facial drooping, fever/chills, n/v, neck stiffness. 29 year old female with no med hx presented to ED c/o headache. bilateral frontal, tense, w/ right facial tingling. admits to hx of mirgraines, has followed up with neurologist with normal imaging. denies difficulty talking, numbness, facial drooping, fever/chills, n/v, neck stiffness.

## 2024-04-30 NOTE — ED PROVIDER NOTE - NSFOLLOWUPINSTRUCTIONS_ED_ALL_ED_FT
Follow up with PCP within 1-2 days   Take tylenol every 4-6  headaches   Return if new or worsening symptoms     Headache    A headache is pain or discomfort felt around the head or neck area. The specific cause of a headache may not be found as there are many types including tension headaches, migraine headaches, and cluster headaches. Watch your condition for any changes. Things you can do to manage your pain include taking over the counter and prescription medications as instructed by your health care provider, lying down in a dark quiet room, limiting stress, getting regular sleep, and refraining from alcohol and tobacco products.    SEEK IMMEDIATE MEDICAL CARE IF YOU HAVE ANY OF THE FOLLOWING SYMPTOMS: fever, vomiting, stiff neck, loss of vision, problems with speech, muscle weakness, loss of balance, trouble walking, passing out, or confusion.